# Patient Record
Sex: FEMALE | Race: WHITE | NOT HISPANIC OR LATINO
[De-identification: names, ages, dates, MRNs, and addresses within clinical notes are randomized per-mention and may not be internally consistent; named-entity substitution may affect disease eponyms.]

---

## 2019-09-16 ENCOUNTER — ASOB RESULT (OUTPATIENT)
Age: 29
End: 2019-09-16

## 2019-09-16 ENCOUNTER — APPOINTMENT (OUTPATIENT)
Dept: ANTEPARTUM | Facility: CLINIC | Age: 29
End: 2019-09-16
Payer: COMMERCIAL

## 2019-09-16 PROBLEM — Z00.00 ENCOUNTER FOR PREVENTIVE HEALTH EXAMINATION: Status: ACTIVE | Noted: 2019-09-16

## 2019-09-16 PROCEDURE — 76805 OB US >/= 14 WKS SNGL FETUS: CPT

## 2019-12-22 ENCOUNTER — INPATIENT (INPATIENT)
Facility: HOSPITAL | Age: 29
LOS: 1 days | Discharge: HOME | End: 2019-12-24
Attending: OBSTETRICS & GYNECOLOGY | Admitting: OBSTETRICS & GYNECOLOGY
Payer: COMMERCIAL

## 2019-12-22 VITALS
RESPIRATION RATE: 18 BRPM | SYSTOLIC BLOOD PRESSURE: 124 MMHG | HEART RATE: 71 BPM | OXYGEN SATURATION: 100 % | DIASTOLIC BLOOD PRESSURE: 65 MMHG

## 2019-12-22 DIAGNOSIS — Z34.80 ENCOUNTER FOR SUPERVISION OF OTHER NORMAL PREGNANCY, UNSPECIFIED TRIMESTER: ICD-10-CM

## 2019-12-22 DIAGNOSIS — O26.899 OTHER SPECIFIED PREGNANCY RELATED CONDITIONS, UNSPECIFIED TRIMESTER: ICD-10-CM

## 2019-12-22 DIAGNOSIS — Z3A.00 WEEKS OF GESTATION OF PREGNANCY NOT SPECIFIED: ICD-10-CM

## 2019-12-22 LAB
ALBUMIN SERPL ELPH-MCNC: 3.5 G/DL — SIGNIFICANT CHANGE UP (ref 3.3–5)
ALP SERPL-CCNC: 231 U/L — HIGH (ref 40–120)
ALT FLD-CCNC: 7 U/L — LOW (ref 10–45)
ANION GAP SERPL CALC-SCNC: 13 MMOL/L — SIGNIFICANT CHANGE UP (ref 5–17)
APTT BLD: 34.7 SEC — SIGNIFICANT CHANGE UP (ref 27.5–36.3)
AST SERPL-CCNC: 16 U/L — SIGNIFICANT CHANGE UP (ref 10–40)
BASOPHILS # BLD AUTO: 0.04 K/UL — SIGNIFICANT CHANGE UP (ref 0–0.2)
BASOPHILS NFR BLD AUTO: 0.4 % — SIGNIFICANT CHANGE UP (ref 0–2)
BILIRUB SERPL-MCNC: 0.3 MG/DL — SIGNIFICANT CHANGE UP (ref 0.2–1.2)
BLD GP AB SCN SERPL QL: NEGATIVE — SIGNIFICANT CHANGE UP
BUN SERPL-MCNC: 11 MG/DL — SIGNIFICANT CHANGE UP (ref 7–23)
CALCIUM SERPL-MCNC: 9.3 MG/DL — SIGNIFICANT CHANGE UP (ref 8.4–10.5)
CHLORIDE SERPL-SCNC: 103 MMOL/L — SIGNIFICANT CHANGE UP (ref 96–108)
CO2 SERPL-SCNC: 22 MMOL/L — SIGNIFICANT CHANGE UP (ref 22–31)
CREAT SERPL-MCNC: 0.62 MG/DL — SIGNIFICANT CHANGE UP (ref 0.5–1.3)
EOSINOPHIL # BLD AUTO: 0.08 K/UL — SIGNIFICANT CHANGE UP (ref 0–0.5)
EOSINOPHIL NFR BLD AUTO: 0.8 % — SIGNIFICANT CHANGE UP (ref 0–6)
FIBRINOGEN PPP-MCNC: 417 MG/DL — SIGNIFICANT CHANGE UP (ref 350–510)
GLUCOSE SERPL-MCNC: 91 MG/DL — SIGNIFICANT CHANGE UP (ref 70–99)
HCT VFR BLD CALC: 36.4 % — SIGNIFICANT CHANGE UP (ref 34.5–45)
HGB BLD-MCNC: 12.1 G/DL — SIGNIFICANT CHANGE UP (ref 11.5–15.5)
IMM GRANULOCYTES NFR BLD AUTO: 1.2 % — SIGNIFICANT CHANGE UP (ref 0–1.5)
INR BLD: 0.92 RATIO — SIGNIFICANT CHANGE UP (ref 0.88–1.16)
LDH SERPL L TO P-CCNC: 140 U/L — SIGNIFICANT CHANGE UP (ref 50–242)
LYMPHOCYTES # BLD AUTO: 1.75 K/UL — SIGNIFICANT CHANGE UP (ref 1–3.3)
LYMPHOCYTES # BLD AUTO: 17.3 % — SIGNIFICANT CHANGE UP (ref 13–44)
MCHC RBC-ENTMCNC: 31.3 PG — SIGNIFICANT CHANGE UP (ref 27–34)
MCHC RBC-ENTMCNC: 33.2 GM/DL — SIGNIFICANT CHANGE UP (ref 32–36)
MCV RBC AUTO: 94.1 FL — SIGNIFICANT CHANGE UP (ref 80–100)
MONOCYTES # BLD AUTO: 0.71 K/UL — SIGNIFICANT CHANGE UP (ref 0–0.9)
MONOCYTES NFR BLD AUTO: 7 % — SIGNIFICANT CHANGE UP (ref 2–14)
NEUTROPHILS # BLD AUTO: 7.41 K/UL — HIGH (ref 1.8–7.4)
NEUTROPHILS NFR BLD AUTO: 73.3 % — SIGNIFICANT CHANGE UP (ref 43–77)
NRBC # BLD: 0 /100 WBCS — SIGNIFICANT CHANGE UP (ref 0–0)
PLATELET # BLD AUTO: 182 K/UL — SIGNIFICANT CHANGE UP (ref 150–400)
POTASSIUM SERPL-MCNC: 3.6 MMOL/L — SIGNIFICANT CHANGE UP (ref 3.5–5.3)
POTASSIUM SERPL-SCNC: 3.6 MMOL/L — SIGNIFICANT CHANGE UP (ref 3.5–5.3)
PROT SERPL-MCNC: 6.5 G/DL — SIGNIFICANT CHANGE UP (ref 6–8.3)
PROTHROM AB SERPL-ACNC: 10.5 SEC — SIGNIFICANT CHANGE UP (ref 10–12.9)
RBC # BLD: 3.87 M/UL — SIGNIFICANT CHANGE UP (ref 3.8–5.2)
RBC # FLD: 13.2 % — SIGNIFICANT CHANGE UP (ref 10.3–14.5)
RH IG SCN BLD-IMP: POSITIVE — SIGNIFICANT CHANGE UP
RH IG SCN BLD-IMP: POSITIVE — SIGNIFICANT CHANGE UP
SODIUM SERPL-SCNC: 138 MMOL/L — SIGNIFICANT CHANGE UP (ref 135–145)
URATE SERPL-MCNC: 4.9 MG/DL — SIGNIFICANT CHANGE UP (ref 2.5–7)
WBC # BLD: 10.11 K/UL — SIGNIFICANT CHANGE UP (ref 3.8–10.5)
WBC # FLD AUTO: 10.11 K/UL — SIGNIFICANT CHANGE UP (ref 3.8–10.5)

## 2019-12-22 RX ORDER — OXYCODONE HYDROCHLORIDE 5 MG/1
5 TABLET ORAL
Refills: 0 | Status: DISCONTINUED | OUTPATIENT
Start: 2019-12-22 | End: 2019-12-24

## 2019-12-22 RX ORDER — OXYTOCIN 10 UNIT/ML
333.33 VIAL (ML) INJECTION
Qty: 20 | Refills: 0 | Status: DISCONTINUED | OUTPATIENT
Start: 2019-12-22 | End: 2019-12-24

## 2019-12-22 RX ORDER — SIMETHICONE 80 MG/1
80 TABLET, CHEWABLE ORAL EVERY 4 HOURS
Refills: 0 | Status: DISCONTINUED | OUTPATIENT
Start: 2019-12-22 | End: 2019-12-24

## 2019-12-22 RX ORDER — SODIUM CHLORIDE 9 MG/ML
1000 INJECTION, SOLUTION INTRAVENOUS
Refills: 0 | Status: DISCONTINUED | OUTPATIENT
Start: 2019-12-22 | End: 2019-12-22

## 2019-12-22 RX ORDER — BENZOCAINE 10 %
1 GEL (GRAM) MUCOUS MEMBRANE EVERY 6 HOURS
Refills: 0 | Status: DISCONTINUED | OUTPATIENT
Start: 2019-12-22 | End: 2019-12-24

## 2019-12-22 RX ORDER — SODIUM CHLORIDE 9 MG/ML
3 INJECTION INTRAMUSCULAR; INTRAVENOUS; SUBCUTANEOUS EVERY 8 HOURS
Refills: 0 | Status: DISCONTINUED | OUTPATIENT
Start: 2019-12-22 | End: 2019-12-24

## 2019-12-22 RX ORDER — PRAMOXINE HYDROCHLORIDE 150 MG/15G
1 AEROSOL, FOAM RECTAL EVERY 4 HOURS
Refills: 0 | Status: DISCONTINUED | OUTPATIENT
Start: 2019-12-22 | End: 2019-12-24

## 2019-12-22 RX ORDER — IBUPROFEN 200 MG
600 TABLET ORAL EVERY 6 HOURS
Refills: 0 | Status: DISCONTINUED | OUTPATIENT
Start: 2019-12-22 | End: 2019-12-24

## 2019-12-22 RX ORDER — AER TRAVELER 0.5 G/1
1 SOLUTION RECTAL; TOPICAL EVERY 4 HOURS
Refills: 0 | Status: DISCONTINUED | OUTPATIENT
Start: 2019-12-22 | End: 2019-12-24

## 2019-12-22 RX ORDER — TETANUS TOXOID, REDUCED DIPHTHERIA TOXOID AND ACELLULAR PERTUSSIS VACCINE, ADSORBED 5; 2.5; 8; 8; 2.5 [IU]/.5ML; [IU]/.5ML; UG/.5ML; UG/.5ML; UG/.5ML
0.5 SUSPENSION INTRAMUSCULAR ONCE
Refills: 0 | Status: DISCONTINUED | OUTPATIENT
Start: 2019-12-22 | End: 2019-12-24

## 2019-12-22 RX ORDER — GLYCERIN ADULT
1 SUPPOSITORY, RECTAL RECTAL AT BEDTIME
Refills: 0 | Status: DISCONTINUED | OUTPATIENT
Start: 2019-12-22 | End: 2019-12-24

## 2019-12-22 RX ORDER — DIBUCAINE 1 %
1 OINTMENT (GRAM) RECTAL EVERY 6 HOURS
Refills: 0 | Status: DISCONTINUED | OUTPATIENT
Start: 2019-12-22 | End: 2019-12-24

## 2019-12-22 RX ORDER — OXYTOCIN 10 UNIT/ML
4 VIAL (ML) INJECTION
Qty: 30 | Refills: 0 | Status: DISCONTINUED | OUTPATIENT
Start: 2019-12-22 | End: 2019-12-22

## 2019-12-22 RX ORDER — ACETAMINOPHEN 500 MG
975 TABLET ORAL
Refills: 0 | Status: DISCONTINUED | OUTPATIENT
Start: 2019-12-22 | End: 2019-12-24

## 2019-12-22 RX ORDER — OXYCODONE HYDROCHLORIDE 5 MG/1
5 TABLET ORAL ONCE
Refills: 0 | Status: DISCONTINUED | OUTPATIENT
Start: 2019-12-22 | End: 2019-12-24

## 2019-12-22 RX ORDER — DIPHENHYDRAMINE HCL 50 MG
25 CAPSULE ORAL EVERY 6 HOURS
Refills: 0 | Status: DISCONTINUED | OUTPATIENT
Start: 2019-12-22 | End: 2019-12-24

## 2019-12-22 RX ORDER — KETOROLAC TROMETHAMINE 30 MG/ML
30 SYRINGE (ML) INJECTION ONCE
Refills: 0 | Status: DISCONTINUED | OUTPATIENT
Start: 2019-12-22 | End: 2019-12-22

## 2019-12-22 RX ORDER — HYDROCORTISONE 1 %
1 OINTMENT (GRAM) TOPICAL EVERY 6 HOURS
Refills: 0 | Status: DISCONTINUED | OUTPATIENT
Start: 2019-12-22 | End: 2019-12-24

## 2019-12-22 RX ORDER — LANOLIN
1 OINTMENT (GRAM) TOPICAL EVERY 6 HOURS
Refills: 0 | Status: DISCONTINUED | OUTPATIENT
Start: 2019-12-22 | End: 2019-12-24

## 2019-12-22 RX ORDER — MAGNESIUM HYDROXIDE 400 MG/1
30 TABLET, CHEWABLE ORAL
Refills: 0 | Status: DISCONTINUED | OUTPATIENT
Start: 2019-12-22 | End: 2019-12-24

## 2019-12-22 RX ORDER — OXYTOCIN 10 UNIT/ML
333.33 VIAL (ML) INJECTION
Qty: 20 | Refills: 0 | Status: COMPLETED | OUTPATIENT
Start: 2019-12-22 | End: 2019-12-22

## 2019-12-22 RX ORDER — IBUPROFEN 200 MG
600 TABLET ORAL EVERY 6 HOURS
Refills: 0 | Status: COMPLETED | OUTPATIENT
Start: 2019-12-22 | End: 2020-11-19

## 2019-12-22 RX ORDER — CITRIC ACID/SODIUM CITRATE 300-500 MG
15 SOLUTION, ORAL ORAL EVERY 6 HOURS
Refills: 0 | Status: DISCONTINUED | OUTPATIENT
Start: 2019-12-22 | End: 2019-12-22

## 2019-12-22 RX ADMIN — Medication 975 MILLIGRAM(S): at 14:42

## 2019-12-22 RX ADMIN — Medication 600 MILLIGRAM(S): at 17:35

## 2019-12-22 RX ADMIN — Medication 1000 MILLIUNIT(S)/MIN: at 13:37

## 2019-12-22 RX ADMIN — Medication 30 MILLIGRAM(S): at 12:01

## 2019-12-22 RX ADMIN — Medication 600 MILLIGRAM(S): at 17:57

## 2019-12-22 RX ADMIN — Medication 975 MILLIGRAM(S): at 15:15

## 2019-12-22 RX ADMIN — Medication 975 MILLIGRAM(S): at 22:30

## 2019-12-22 RX ADMIN — Medication 1000 MILLIUNIT(S)/MIN: at 13:38

## 2019-12-22 RX ADMIN — SODIUM CHLORIDE 125 MILLILITER(S): 9 INJECTION, SOLUTION INTRAVENOUS at 08:03

## 2019-12-22 RX ADMIN — Medication 975 MILLIGRAM(S): at 21:38

## 2019-12-22 RX ADMIN — Medication 600 MILLIGRAM(S): at 23:45

## 2019-12-22 RX ADMIN — SODIUM CHLORIDE 3 MILLILITER(S): 9 INJECTION INTRAMUSCULAR; INTRAVENOUS; SUBCUTANEOUS at 23:42

## 2019-12-23 LAB
HCT VFR BLD CALC: 30.8 % — LOW (ref 34.5–45)
HGB BLD-MCNC: 10.4 G/DL — LOW (ref 11.5–15.5)
T PALLIDUM AB TITR SER: NEGATIVE — SIGNIFICANT CHANGE UP

## 2019-12-23 RX ADMIN — Medication 975 MILLIGRAM(S): at 09:57

## 2019-12-23 RX ADMIN — Medication 600 MILLIGRAM(S): at 00:30

## 2019-12-23 RX ADMIN — Medication 600 MILLIGRAM(S): at 15:04

## 2019-12-23 RX ADMIN — Medication 600 MILLIGRAM(S): at 06:17

## 2019-12-23 RX ADMIN — Medication 600 MILLIGRAM(S): at 15:34

## 2019-12-23 RX ADMIN — Medication 975 MILLIGRAM(S): at 21:41

## 2019-12-23 RX ADMIN — Medication 975 MILLIGRAM(S): at 03:25

## 2019-12-23 RX ADMIN — Medication 1 TABLET(S): at 15:03

## 2019-12-23 RX ADMIN — Medication 975 MILLIGRAM(S): at 21:11

## 2019-12-23 RX ADMIN — Medication 975 MILLIGRAM(S): at 10:27

## 2019-12-23 RX ADMIN — Medication 975 MILLIGRAM(S): at 04:00

## 2019-12-23 RX ADMIN — SODIUM CHLORIDE 3 MILLILITER(S): 9 INJECTION INTRAMUSCULAR; INTRAVENOUS; SUBCUTANEOUS at 06:18

## 2019-12-23 NOTE — PROGRESS NOTE ADULT - SUBJECTIVE AND OBJECTIVE BOX
OB Progress Note:  PPD#1    S: 30yo  PPD#1 s/p . Patient feels well. Pain is well controlled, tolerating regular diet, passing flatus, voiding spontaneously, ambulating without difficulty. Denies heavy vaginal bleeding, CP/SOB, N/V, lightheadedness/dizziness.     O:  Vitals:  Vital Signs Last 24 Hrs  T(C): 36.8 (23 Dec 2019 05:06), Max: 37.2 (22 Dec 2019 21:25)  T(F): 98.2 (23 Dec 2019 05:06), Max: 99 (22 Dec 2019 21:25)  HR: 63 (23 Dec 2019 05:06) (50 - 83)  BP: 99/54 (23 Dec 2019 05:06) (99/54 - 135/69)  BP(mean): --  RR: 18 (23 Dec 2019 05:06) (17 - 18)  SpO2: 97% (23 Dec 2019 05:06) (97% - 100%)    MEDICATIONS  (STANDING):  acetaminophen   Tablet .. 975 milliGRAM(s) Oral <User Schedule>  diphtheria/tetanus/pertussis (acellular) Vaccine (ADAcel) 0.5 milliLiter(s) IntraMuscular once  ibuprofen  Tablet. 600 milliGRAM(s) Oral every 6 hours  oxytocin Infusion 333.333 milliUNIT(s)/Min (1000 mL/Hr) IV Continuous <Continuous>  prenatal multivitamin 1 Tablet(s) Oral daily  sodium chloride 0.9% lock flush 3 milliLiter(s) IV Push every 8 hours      Labs:  Blood type: O Positive  Rubella IgG: RPR: Negative                          12.1   10.11 >-----------< 182    (  @ 07:49 )             36.4    19 @ 08:05      138  |  103  |  11  ----------------------------<  91  3.6   |  22  |  0.62        Ca    9.3      22 Dec 2019 08:05    TPro  6.5  /  Alb  3.5  /  TBili  0.3  /  DBili  x   /  AST  16  /  ALT  7<L>  /  AlkPhos  231<H>  19 @ 08:05          Physical Exam:  General: NAD  Abdomen: soft, non-tender, non-distended, fundus firm  Vaginal: No heavy vaginal bleeding  Extremities: No erythema/edema

## 2019-12-23 NOTE — PROGRESS NOTE ADULT - PROBLEM SELECTOR PLAN 1
- Pain well controlled, continue current pain regimen  - Increase ambulation, SCDs when not ambulating  - Continue regular diet      Merna Dooley, PGY-1

## 2019-12-24 ENCOUNTER — TRANSCRIPTION ENCOUNTER (OUTPATIENT)
Age: 29
End: 2019-12-24

## 2019-12-24 VITALS
RESPIRATION RATE: 18 BRPM | HEART RATE: 67 BPM | OXYGEN SATURATION: 98 % | DIASTOLIC BLOOD PRESSURE: 83 MMHG | TEMPERATURE: 98 F | SYSTOLIC BLOOD PRESSURE: 134 MMHG

## 2019-12-24 PROCEDURE — 85014 HEMATOCRIT: CPT

## 2019-12-24 PROCEDURE — 80053 COMPREHEN METABOLIC PANEL: CPT

## 2019-12-24 PROCEDURE — G0463: CPT

## 2019-12-24 PROCEDURE — 83615 LACTATE (LD) (LDH) ENZYME: CPT

## 2019-12-24 PROCEDURE — 85610 PROTHROMBIN TIME: CPT

## 2019-12-24 PROCEDURE — 85384 FIBRINOGEN ACTIVITY: CPT

## 2019-12-24 PROCEDURE — 86850 RBC ANTIBODY SCREEN: CPT

## 2019-12-24 PROCEDURE — 84550 ASSAY OF BLOOD/URIC ACID: CPT

## 2019-12-24 PROCEDURE — 85730 THROMBOPLASTIN TIME PARTIAL: CPT

## 2019-12-24 PROCEDURE — 59025 FETAL NON-STRESS TEST: CPT

## 2019-12-24 PROCEDURE — 86900 BLOOD TYPING SEROLOGIC ABO: CPT

## 2019-12-24 PROCEDURE — 59050 FETAL MONITOR W/REPORT: CPT

## 2019-12-24 PROCEDURE — 86780 TREPONEMA PALLIDUM: CPT

## 2019-12-24 PROCEDURE — 86901 BLOOD TYPING SEROLOGIC RH(D): CPT

## 2019-12-24 PROCEDURE — 85018 HEMOGLOBIN: CPT

## 2019-12-24 PROCEDURE — 85027 COMPLETE CBC AUTOMATED: CPT

## 2019-12-24 RX ADMIN — Medication 975 MILLIGRAM(S): at 09:06

## 2019-12-24 RX ADMIN — Medication 600 MILLIGRAM(S): at 00:42

## 2019-12-24 RX ADMIN — Medication 975 MILLIGRAM(S): at 03:30

## 2019-12-24 RX ADMIN — Medication 600 MILLIGRAM(S): at 00:12

## 2019-12-24 RX ADMIN — Medication 600 MILLIGRAM(S): at 06:09

## 2019-12-24 RX ADMIN — Medication 600 MILLIGRAM(S): at 06:39

## 2019-12-24 RX ADMIN — Medication 975 MILLIGRAM(S): at 03:00

## 2019-12-24 NOTE — DISCHARGE NOTE OB - CARE PLAN
(3) slightly limited Principal Discharge DX:	Pregnancy  Goal:	home  Assessment and plan of treatment:	per pmd

## 2019-12-24 NOTE — DISCHARGE NOTE OB - PATIENT PORTAL LINK FT
You can access the FollowMyHealth Patient Portal offered by [facility] by registering at the following website: [email or web address]. By joining [employer]’s FollowMyHealth portal, you will also be able to view your health information using other applications (apps) compatible with our system.

## 2019-12-24 NOTE — DISCHARGE NOTE OB - CARE PROVIDER_API CALL
Samy Hunter)  Obstetrics and Gynecology  [address] [place] [id number]  Phone: [phone]  Fax: [phone]  Follow Up Time:

## 2019-12-24 NOTE — PROGRESS NOTE ADULT - SUBJECTIVE AND OBJECTIVE BOX
S: Patient doing well. No complaints. Minimal lochia. Pain controlled.    O: Vital Signs Last 24 Hrs  T(C): 36.7 (24 Dec 2019 05:09), Max: 36.7 (24 Dec 2019 05:09)  T(F): 98.1 (24 Dec 2019 05:09), Max: 98.1 (24 Dec 2019 05:09)  HR: 59 (24 Dec 2019 05:09) (58 - 60)  BP: 120/75 (24 Dec 2019 05:09) (110/77 - 120/75)  BP(mean): --  RR: 18 (24 Dec 2019 05:09) (18 - 18)  SpO2: 96% (23 Dec 2019 17:41) (96% - 99%)    Gen: NAD  Abd: soft, Nontender, Nondistended, fundus firm  Ext: no tenderness, mild edema    Labs:                        10.4   x     )-----------( x        ( 23 Dec 2019 07:43 )             30.8       A: 29y PPD#2 s/p  doing well.    Plan:  Routine postpartum care  Encouraged out of bed  Regular diet    Discharge  MARLON Toney MD

## 2021-04-22 ENCOUNTER — APPOINTMENT (OUTPATIENT)
Dept: ORTHOPEDIC SURGERY | Facility: CLINIC | Age: 31
End: 2021-04-22
Payer: COMMERCIAL

## 2021-04-22 VITALS
HEART RATE: 73 BPM | WEIGHT: 125 LBS | DIASTOLIC BLOOD PRESSURE: 80 MMHG | HEIGHT: 67 IN | BODY MASS INDEX: 19.62 KG/M2 | SYSTOLIC BLOOD PRESSURE: 130 MMHG

## 2021-04-22 DIAGNOSIS — Z78.9 OTHER SPECIFIED HEALTH STATUS: ICD-10-CM

## 2021-04-22 DIAGNOSIS — M75.22 BICIPITAL TENDINITIS, LEFT SHOULDER: ICD-10-CM

## 2021-04-22 PROCEDURE — 99203 OFFICE O/P NEW LOW 30 MIN: CPT

## 2021-04-22 PROCEDURE — 99072 ADDL SUPL MATRL&STAF TM PHE: CPT

## 2021-04-22 NOTE — HISTORY OF PRESENT ILLNESS
[de-identified] : RHD Patient is here for left shoulder pain that began about a month ago. She does not recall any inciting injury but she does do MMA training. She thought it was muscular and that it would go away on its own but it has persisted. The pain is located mainly anteriorly. It is worse with movement. There is a constant dull ache. She has used rest to treat it.  Denies N/T/R/Prior injury. She has two children at home. She works a desk job. \par \par The patient's past medical history, past surgical history, medications and allergies were reviewed by me today and documented accordingly. In addition, the patient's family and social history, which were noncontributory to this visit, were reviewed also. Intake form was reviewed. The patient has no family history of arthritis.

## 2021-04-22 NOTE — DISCUSSION/SUMMARY
[de-identified] : Discussed findings of today's exam and possible causes of patient's pain.  Educated patient on their most probable diagnosis of left bicipital tenosynovitis.  Reviewed possible courses of treatment, and we collaboratively decided best course of treatment at this time will include conservative management.  Patient started on a course of oral NSAIDs, prescription given for Mobic (We discussed all possible side effects of this medication).  Patient advised regarding modification of activity with her MMA activity, and potentially with caring for her 1 and 3-year-old children.  If patient has persisting pain would recommend a course of physical therapy at that time.  Follow up as needed.  Patient appreciates and agrees with current plan.\par \par This note was generated using dragon medical dictation software.  A reasonable effort has been made for proofreading its contents, but typos may still remain.  If there are any questions or points of clarification needed please notify my office.\par

## 2021-05-14 ENCOUNTER — RX RENEWAL (OUTPATIENT)
Age: 31
End: 2021-05-14

## 2021-05-14 RX ORDER — MELOXICAM 7.5 MG/1
7.5 TABLET ORAL
Qty: 30 | Refills: 0 | Status: ACTIVE | COMMUNITY
Start: 2021-04-22 | End: 1900-01-01

## 2021-08-18 ENCOUNTER — NON-APPOINTMENT (OUTPATIENT)
Age: 31
End: 2021-08-18

## 2021-08-19 ENCOUNTER — APPOINTMENT (OUTPATIENT)
Dept: ORTHOPEDIC SURGERY | Facility: CLINIC | Age: 31
End: 2021-08-19
Payer: COMMERCIAL

## 2021-08-19 DIAGNOSIS — G57.02 LESION OF SCIATIC NERVE, LEFT LOWER LIMB: ICD-10-CM

## 2021-08-19 PROCEDURE — 99213 OFFICE O/P EST LOW 20 MIN: CPT

## 2021-08-19 NOTE — DISCUSSION/SUMMARY
[de-identified] : Discussed findings of today's exam and possible causes of patient's pain.  Educated patient on their most probable diagnosis of left piriformis syndrome/sciatica.  Reviewed possible courses of treatment, and we collaboratively decided best course of treatment at this time will include conservative management.  Patient has Mobic at home, she may take this medication as needed for pain.  Patient will be started on a course of physical therapy to restore normal range of motion and strength as tolerated.  We discussed appropriate activity modification regarding her MMA activity.  Follow up as needed.  Patient appreciates and agrees with current plan.\par \par This note was generated using dragon medical dictation software.  A reasonable effort has been made for proofreading its contents, but typos may still remain.  If there are any questions or points of clarification needed please notify my office.\par

## 2021-08-19 NOTE — PHYSICAL EXAM
[de-identified] : Constitutional: Well-nourished, well-developed, No acute distress\par Respiratory:  Good respiratory effort, no SOB\par Lymphatic: No regional lymphadenopathy, no lymphedema\par Psychiatric: Pleasant and normal affect, alert and oriented x3\par Musculoskeletal: normal except where as noted in regional exam\par \par Lumbar Spine Exam\par \par APPEARANCE: no marked deformities or malalignment, normal curvature of the lumbosacral spine. no marked deformities, good posture.\par POSITIVE TENDERNESS: none\par NONTENDER: no bony midline tenderness, no marked tenderness in paravertebral muscles or erector spinae group, no marked spasm.  no marked tenderness in lumbar, lumbosacral, or iliac areas.\par ROM: full & painless in all planes\par RESISTIVE TESTING: painless 5/5 resisted flex/ext, sidebending b/l, and rotation\par SPECIAL TESTS: neg SLR b/l, neg BONI b/l, neg Trendelenburg b/l \par PULSES: 2+ DP/PT pulses\par NEURO:  L1 - S2 intact to sensation and motor, DTRs 2+/4 patella and achilles\par \par Left hip:\par APPEARANCE: no marked deformities, no swelling or malalignment\par POSITIVE TENDERNESS: + Piriformis at midsubstance and at its insertion on the greater trochanter\par NONTENDER: greater trochanter, trochanteric bursa, TFL, gluteal region, ischium/proximal hamstring region, hip flexor region, sartorius and pubic symphysis. \par ROM: full & painless. \par RESISTIVE TESTING: Mild pain with resisted hip ER, particularly at 90 of hip flexion, painless resisted IR/SLR/abduction/adduction. \par SPECIAL TESTS: BONI reproduces mild pain and tightness in the buttock region, neg FADIR, painless loaded flexion & scouring\par

## 2021-08-19 NOTE — HISTORY OF PRESENT ILLNESS
[de-identified] : Patient is here for LBP that began about a week ago. There was no inciting injury. She thought she pulled a muscle when exercising. She went on vacation. when she returned to exercise she began having pain in her left buttock that radiates down towards the knee. It is better with sitting. She has used Tylenol and Meloxicam to treat it. Denies N/T/Prior injury/bowel or bladder dysfunction/saddle anesthesia.

## 2021-09-23 ENCOUNTER — APPOINTMENT (OUTPATIENT)
Dept: ORTHOPEDIC SURGERY | Facility: CLINIC | Age: 31
End: 2021-09-23
Payer: COMMERCIAL

## 2021-09-23 VITALS — WEIGHT: 120 LBS | BODY MASS INDEX: 18.83 KG/M2 | HEIGHT: 67 IN

## 2021-09-23 PROCEDURE — 72100 X-RAY EXAM L-S SPINE 2/3 VWS: CPT

## 2021-09-23 PROCEDURE — 99213 OFFICE O/P EST LOW 20 MIN: CPT

## 2021-09-24 NOTE — HISTORY OF PRESENT ILLNESS
[de-identified] : Patient is here for LBP follow up. She states that she attended PT and feels like at one point she pushed herself to fall with an exercise. She feels like her body has shifted. She states that the pain is worse. There was no trauma. She is taking Meloxicam that we prescribed for her during the day and Flexeril at night which was prescribed by her .

## 2021-09-24 NOTE — PHYSICAL EXAM
[de-identified] : Constitutional: Well-nourished, well-developed, No acute distress\par Respiratory:  Good respiratory effort, no SOB\par Lymphatic: No regional lymphadenopathy, no lymphedema\par Psychiatric: Pleasant and normal affect, alert and oriented x3\par Musculoskeletal: normal except where as noted in regional exam\par \par Lumbar Spine Exam\par \par APPEARANCE: no marked deformities or malalignment, normal curvature of the lumbosacral spine. no marked deformities, good posture.\par POSITIVE TENDERNESS: none\par NONTENDER: no bony midline tenderness, no marked tenderness in paravertebral muscles or erector spinae group, no marked spasm.  no marked tenderness in lumbar, lumbosacral, or iliac areas.\par ROM: full & painless in all planes\par RESISTIVE TESTING: painless 5/5 resisted flex/ext, sidebending b/l, and rotation\par SPECIAL TESTS: neg SLR b/l, neg BONI b/l, neg Trendelenburg b/l \par PULSES: 2+ DP/PT pulses\par NEURO:  L1 - S2 intact to sensation and motor, DTRs 2+/4 patella and achilles\par \par Left hip:\par APPEARANCE: no marked deformities, no swelling or malalignment\par POSITIVE TENDERNESS: + Piriformis at midsubstance and at its insertion on the greater trochanter\par NONTENDER: greater trochanter, trochanteric bursa, TFL, gluteal region, ischium/proximal hamstring region, hip flexor region, sartorius and pubic symphysis. \par ROM: full & painless. \par RESISTIVE TESTING: Mild pain with resisted hip ER, particularly at 90 of hip flexion, painless resisted IR/SLR/abduction/adduction. \par SPECIAL TESTS: BONI reproduces mild pain and tightness in the buttock region, neg FADIR, painless loaded flexion & scouring\par  [de-identified] : \par The following radiographs were ordered and read by me during this patient's visit. I reviewed each radiograph in detail with the patient and discussed the findings as highlighted below. \par \par 2 views of the lumbar spine were obtained today that show no fracture, or dislocation. There are no degenerative changes seen. There is no significant malalignment but there is slight dextroscoliosis. No obvious osseous abnormality. Otherwise unremarkable.

## 2021-09-24 NOTE — DISCUSSION/SUMMARY
[de-identified] : Patient was seen today for reevaluation of low back, buttock and left lower extremity pain.  Based on history and today's clinical exam it seems more likely that patient has left lumbar radiculopathy than she does have left piriformis syndrome/sciatica.  At this time recommend MRI of the lumbar spine to evaluate for left-sided disc herniation as etiology of the patient's pain.  If present, she would benefit from physiatry consultation for PATSY.  Patient may continue taking Mobic during the day, Flexeril at night, and continue her course of physical therapy to see if he can help alleviate this persistent pain.  Patient will follow-up once MRI results are available.  Patient and spouse appreciate and agree with current plan.\par \par I work as part of an academic orthopedic group and routinely have a physician in training (resident / fellow) working with me.  Any part of the history and physical exam performed by the physician in training was either directly reviewed and/or replicated by myself.  Any procedure performed by the physician in training was performed under my direct supervision and with the consent of the patient.\par \par This note was generated using dragon medical dictation software.  A reasonable effort has been made for proofreading its contents, but typos may still remain.  If there are any questions or points of clarification needed please notify my office.\par

## 2021-09-28 ENCOUNTER — RESULT REVIEW (OUTPATIENT)
Age: 31
End: 2021-09-28

## 2021-09-28 ENCOUNTER — APPOINTMENT (OUTPATIENT)
Dept: MRI IMAGING | Facility: CLINIC | Age: 31
End: 2021-09-28
Payer: COMMERCIAL

## 2021-09-28 PROCEDURE — 72148 MRI LUMBAR SPINE W/O DYE: CPT

## 2021-09-29 ENCOUNTER — NON-APPOINTMENT (OUTPATIENT)
Age: 31
End: 2021-09-29

## 2021-10-06 ENCOUNTER — APPOINTMENT (OUTPATIENT)
Dept: PHYSICAL MEDICINE AND REHAB | Facility: CLINIC | Age: 31
End: 2021-10-06
Payer: COMMERCIAL

## 2021-10-06 PROCEDURE — 99204 OFFICE O/P NEW MOD 45 MIN: CPT

## 2021-10-06 RX ORDER — PREDNISONE 10 MG/1
10 TABLET ORAL
Qty: 30 | Refills: 0 | Status: ACTIVE | COMMUNITY
Start: 2021-10-06 | End: 1900-01-01

## 2021-10-11 NOTE — ASSESSMENT
[FreeTextEntry1] : 29 yo F who presents with low back pain with radiation into the left lower extremity consistent with lumbar radiculopathy secondary to lumbosacral stenosis and lumbar disc herniation.\par \par I had an extended discussion with Ms. Cai on this visit.  Various topics were covered including diagnosis, treatment options, and prognosis.  Relevant anatomy and treatment rationale reviewed.  Anticipatory guidance provided including instructions on how to manage future flares were discussed.  Lastly, red flag signs and symptoms were reviewed and patient instructed to seek immediate medical attention should these arise.\par \par Several treatment options discussed with Ms. Cai on this visit including lumbar PATSY.  Ms. Cai has elected for further treatments with PO medication and PT/HEP.\par \par -Orthopedic notes reviewed\par -MRI lumbar spine w/o contrast reviewed\par -Prednisone 40 mg PO taper ordered.  Risks and complications including gastritis, hyperglycemia, and GI bleed were reviewed with patient on this visit.  I recommend that patient take medication with food and to immediately stop medication should he develop abdominal pain, nausea, vomiting, diarrhea, frequent urination, lightheadedness, or dizziness.\par -RTC 4 weeks, If pain persists or worsen despite compliance with above, then will consider scheduling lumbar PATSY at next visit if patient is amenable.\par \par Wesley Yang MD\par Spine and Sports Medicine\par \par [employer]\par At [facility]\par \par

## 2021-10-11 NOTE — HISTORY OF PRESENT ILLNESS
[FreeTextEntry1] : 29 yo F with no PMH who presents with low back pain.\par \par Onset: 6 weeks.  Started after abdominal crunches.  No inciting events, trauma, or falls.\par Location: lower lumbar spine\par Characteristics: sharp \par Aggravating factors: prolonged standing, walking\par Alleviating factors: rest\par Radiation: left lower extremity down to the left calf and foot.\par Treatments: tylenol, aleve, rest, physical therapy, HEP with minimal relief\par Severity: 7-9/10\par \par Diagnostic studies:\par MRI lumbar spine w/o contrast showing central-left sided disc herniation at L4-L5 with left foraminal stenosis and lateral recess stenosis.\par No EMG/NCS\par \par Patient denies new weakness, numbness or paresthesia.  Denies bowel/bladder dysfunction, saddle anesthesia, fevers, chills, weight loss, night pain, or night sweats.\par \par

## 2021-10-11 NOTE — PHYSICAL EXAM
[FreeTextEntry1] : Gen: NAD\par HEENT: neck supple\par CV: no cyanosis\par Pulm: breathing well on room air\par Abd: soft\par Low back: range of motion limited by pain, tenderness to palpation lower lumbar paraspinals and left sciatic notch, +straight leg raise LLE, neg FABERE, neg FAIR\par Msk: \par 5/5 hip flexion B/L, 5/5 knee extension B/L, 5/5 knee flexion B/L, 5/5 dorsiflexion B/L, 5/5 EHL B/L, 5/5 plantar flexion B/L\par 5/5 shoulder abduction B/L, 5/5 elbow flexion B/L, 5/5 elbow extension B/L, 5/5 wrist extension B/L, 5/5 hand  B/L\par Neuro: sensation intact to light touch in bilateral upper and lower extremities, reflexes 2+ brachioradialis, biceps, triceps bilaterally, reflexes 2+ patella, medial hamstring, achilles bilaterally, negative babinski, negative holt\par

## 2021-10-20 ENCOUNTER — APPOINTMENT (OUTPATIENT)
Dept: PHYSICAL MEDICINE AND REHAB | Facility: CLINIC | Age: 31
End: 2021-10-20
Payer: COMMERCIAL

## 2021-10-20 DIAGNOSIS — Z01.818 ENCOUNTER FOR OTHER PREPROCEDURAL EXAMINATION: ICD-10-CM

## 2021-10-20 PROCEDURE — 99442: CPT

## 2021-10-29 ENCOUNTER — APPOINTMENT (OUTPATIENT)
Dept: DISASTER EMERGENCY | Facility: CLINIC | Age: 31
End: 2021-10-29

## 2021-11-02 ENCOUNTER — OUTPATIENT (OUTPATIENT)
Dept: OUTPATIENT SERVICES | Facility: HOSPITAL | Age: 31
LOS: 1 days | End: 2021-11-02
Payer: COMMERCIAL

## 2021-11-02 ENCOUNTER — APPOINTMENT (OUTPATIENT)
Dept: PHYSICAL MEDICINE AND REHAB | Facility: CLINIC | Age: 31
End: 2021-11-02

## 2021-11-02 DIAGNOSIS — M54.16 RADICULOPATHY, LUMBAR REGION: ICD-10-CM

## 2021-11-02 LAB — SARS-COV-2 N GENE NPH QL NAA+PROBE: NOT DETECTED

## 2021-11-02 PROCEDURE — 64484 NJX AA&/STRD TFRM EPI L/S EA: CPT | Mod: LT

## 2021-11-02 PROCEDURE — 64483 NJX AA&/STRD TFRM EPI L/S 1: CPT

## 2021-11-02 PROCEDURE — 64483 NJX AA&/STRD TFRM EPI L/S 1: CPT | Mod: LT

## 2021-11-02 PROCEDURE — 64484 NJX AA&/STRD TFRM EPI L/S EA: CPT

## 2021-11-16 ENCOUNTER — APPOINTMENT (OUTPATIENT)
Dept: PHYSICAL MEDICINE AND REHAB | Facility: CLINIC | Age: 31
End: 2021-11-16
Payer: COMMERCIAL

## 2021-11-16 DIAGNOSIS — M48.07 SPINAL STENOSIS, LUMBOSACRAL REGION: ICD-10-CM

## 2021-11-16 DIAGNOSIS — M54.50 LOW BACK PAIN, UNSPECIFIED: ICD-10-CM

## 2021-11-16 PROCEDURE — 99214 OFFICE O/P EST MOD 30 MIN: CPT

## 2021-11-16 NOTE — HISTORY OF PRESENT ILLNESS
[FreeTextEntry1] : 11/16/21\par 29 yo F who presents for follow up with low back pain.  Patient s/p left L5-S1, S1 TFESI with significant improvement in her pain.  Pain is now graded as mild to moderate in severity.  However, pain continues to significantly limit her function and capacity to exercise and satisfy her IADL's.  Patient denies new weakness, numbness or paresthesia.  Denies bowel/bladder dysfunction, saddle anesthesia, fevers, chills, weight loss, night pain, or night sweats.\par \par 10/6/21\par 29 yo F with no PMH who presents with low back pain.\par \par Onset: 6 weeks.  Started after abdominal crunches.  No inciting events, trauma, or falls.\par Location: lower lumbar spine\par Characteristics: sharp \par Aggravating factors: prolonged standing, walking\par Alleviating factors: rest\par Radiation: left lower extremity down to the left calf and foot.\par Treatments: tylenol, aleve, rest, physical therapy, HEP with minimal relief\par Severity: 7-9/10\par \par Diagnostic studies:\par MRI lumbar spine w/o contrast showing central-left sided disc herniation at L4-L5 with left foraminal stenosis and lateral recess stenosis.\par No EMG/NCS\par \par Patient denies new weakness, numbness or paresthesia.  Denies bowel/bladder dysfunction, saddle anesthesia, fevers, chills, weight loss, night pain, or night sweats.\par \par

## 2021-11-16 NOTE — ASSESSMENT
[FreeTextEntry1] : 31 yo F who presents with low back pain with radiation into the left lower extremity consistent with lumbar radiculopathy secondary to lumbosacral stenosis and lumbar disc herniation.\par \par Several treatment options discussed with Ms. Cai on this visit including repeat lumbar PATSY.  Ms. Cai has elected for repeat lumbar PATSY and to restart PT/HEP.\par \par -Orthopedic notes reviewed\par -MRI lumbar spine w/o contrast reviewed\par -I recommend that patient undergo left L5-S1, S1 TFESI.  Risks and benefits discussed with patient.  Possible complications including, but not limited to, epidural abscess, hematoma formation, hyperglycemia, DKA, permanent neurologic dysfunction, paralysis, and death, were reviewed.  Will submit for insurance approval.  Once insurance approval has been obtained, patient will be contacted to schedule injection at out-patient ambulatory center.  Covid-19 referral provided to patient on this visit and patient has been instructed to undergo testing per unit protocol.  Covid-19 vaccination policy of our ASC reviewed and patient informed that no spinal injection will be allowed should patient have had the vaccine in the last 14 days or are planning to get the vaccine in the next 14 days.\par -Re-start PT/HEP, new referral provided on this visit.\par \par Wesley Yang MD\par Spine and Sports Medicine\par \par [employer]\par At [facility]\par \par

## 2021-11-16 NOTE — PHYSICAL EXAM
[FreeTextEntry1] : Gen: NAD\par HEENT: neck supple\par CV: no cyanosis\par Pulm: breathing well on room air\par Abd: soft\par Low back: range of motion limited by pain, tenderness to palpation lower lumbar paraspinals and left sciatic notch, +straight leg raise LLE, neg meyer's, neg FABERE, neg FAIR\par Msk: \par 5/5 hip flexion B/L, 5/5 knee extension B/L, 5/5 knee flexion B/L, 5/5 dorsiflexion B/L, 5/5 EHL B/L, 5/5 plantar flexion B/L\par 5/5 shoulder abduction B/L, 5/5 elbow flexion B/L, 5/5 elbow extension B/L, 5/5 wrist extension B/L, 5/5 hand  B/L\par Neuro: sensation intact to light touch in bilateral upper and lower extremities, reflexes 2+ brachioradialis, biceps, triceps bilaterally, reflexes 2+ patella, medial hamstring, achilles bilaterally, negative babinski, negative holt\par

## 2022-01-11 ENCOUNTER — OUTPATIENT (OUTPATIENT)
Dept: OUTPATIENT SERVICES | Facility: HOSPITAL | Age: 32
LOS: 1 days | End: 2022-01-11
Payer: COMMERCIAL

## 2022-01-11 ENCOUNTER — APPOINTMENT (OUTPATIENT)
Dept: PHYSICAL MEDICINE AND REHAB | Facility: AMBULATORY MEDICAL SERVICES | Age: 32
End: 2022-01-11

## 2022-01-11 DIAGNOSIS — M54.16 RADICULOPATHY, LUMBAR REGION: ICD-10-CM

## 2022-01-11 PROCEDURE — 64484 NJX AA&/STRD TFRM EPI L/S EA: CPT | Mod: LT

## 2022-01-11 PROCEDURE — 64483 NJX AA&/STRD TFRM EPI L/S 1: CPT

## 2022-01-11 PROCEDURE — 64484 NJX AA&/STRD TFRM EPI L/S EA: CPT

## 2022-01-11 PROCEDURE — 64483 NJX AA&/STRD TFRM EPI L/S 1: CPT | Mod: LT

## 2022-01-13 DIAGNOSIS — M54.17 RADICULOPATHY, LUMBOSACRAL REGION: ICD-10-CM

## 2022-10-11 ENCOUNTER — RESULT REVIEW (OUTPATIENT)
Age: 32
End: 2022-10-11

## 2022-12-28 ENCOUNTER — ASOB RESULT (OUTPATIENT)
Age: 32
End: 2022-12-28

## 2022-12-28 ENCOUNTER — APPOINTMENT (OUTPATIENT)
Dept: ANTEPARTUM | Facility: CLINIC | Age: 32
End: 2022-12-28

## 2022-12-28 PROCEDURE — 99202 OFFICE O/P NEW SF 15 MIN: CPT | Mod: 25

## 2022-12-28 PROCEDURE — 76811 OB US DETAILED SNGL FETUS: CPT

## 2023-01-18 ENCOUNTER — APPOINTMENT (OUTPATIENT)
Dept: ANTEPARTUM | Facility: CLINIC | Age: 33
End: 2023-01-18
Payer: COMMERCIAL

## 2023-01-18 ENCOUNTER — ASOB RESULT (OUTPATIENT)
Age: 33
End: 2023-01-18

## 2023-01-18 PROCEDURE — 76816 OB US FOLLOW-UP PER FETUS: CPT

## 2023-01-18 PROCEDURE — 76817 TRANSVAGINAL US OBSTETRIC: CPT

## 2023-02-15 ENCOUNTER — APPOINTMENT (OUTPATIENT)
Dept: ANTEPARTUM | Facility: CLINIC | Age: 33
End: 2023-02-15
Payer: COMMERCIAL

## 2023-02-15 ENCOUNTER — ASOB RESULT (OUTPATIENT)
Age: 33
End: 2023-02-15

## 2023-02-15 PROCEDURE — 76817 TRANSVAGINAL US OBSTETRIC: CPT

## 2023-02-15 PROCEDURE — 76816 OB US FOLLOW-UP PER FETUS: CPT

## 2023-02-15 PROCEDURE — 99202 OFFICE O/P NEW SF 15 MIN: CPT | Mod: 25

## 2023-03-06 ENCOUNTER — APPOINTMENT (OUTPATIENT)
Dept: ANTEPARTUM | Facility: CLINIC | Age: 33
End: 2023-03-06
Payer: COMMERCIAL

## 2023-03-06 ENCOUNTER — ASOB RESULT (OUTPATIENT)
Age: 33
End: 2023-03-06

## 2023-03-06 PROCEDURE — ZZZZZ: CPT

## 2023-03-06 PROCEDURE — 76816 OB US FOLLOW-UP PER FETUS: CPT

## 2023-03-06 PROCEDURE — 76817 TRANSVAGINAL US OBSTETRIC: CPT

## 2023-03-20 ENCOUNTER — APPOINTMENT (OUTPATIENT)
Dept: ANTEPARTUM | Facility: CLINIC | Age: 33
End: 2023-03-20
Payer: COMMERCIAL

## 2023-03-20 ENCOUNTER — ASOB RESULT (OUTPATIENT)
Age: 33
End: 2023-03-20

## 2023-03-20 PROCEDURE — 99212 OFFICE O/P EST SF 10 MIN: CPT | Mod: 25

## 2023-03-20 PROCEDURE — 76816 OB US FOLLOW-UP PER FETUS: CPT

## 2023-04-03 ENCOUNTER — ASOB RESULT (OUTPATIENT)
Age: 33
End: 2023-04-03

## 2023-04-03 ENCOUNTER — APPOINTMENT (OUTPATIENT)
Dept: ANTEPARTUM | Facility: CLINIC | Age: 33
End: 2023-04-03
Payer: COMMERCIAL

## 2023-04-03 ENCOUNTER — NON-APPOINTMENT (OUTPATIENT)
Age: 33
End: 2023-04-03

## 2023-04-03 PROCEDURE — 76816 OB US FOLLOW-UP PER FETUS: CPT

## 2023-04-03 PROCEDURE — 99212 OFFICE O/P EST SF 10 MIN: CPT | Mod: 25

## 2023-04-11 ENCOUNTER — ASOB RESULT (OUTPATIENT)
Age: 33
End: 2023-04-11

## 2023-04-11 ENCOUNTER — APPOINTMENT (OUTPATIENT)
Dept: ANTEPARTUM | Facility: CLINIC | Age: 33
End: 2023-04-11
Payer: COMMERCIAL

## 2023-04-11 PROCEDURE — 76818 FETAL BIOPHYS PROFILE W/NST: CPT

## 2023-04-11 PROCEDURE — 76820 UMBILICAL ARTERY ECHO: CPT

## 2023-04-18 ENCOUNTER — ASOB RESULT (OUTPATIENT)
Age: 33
End: 2023-04-18

## 2023-04-18 ENCOUNTER — APPOINTMENT (OUTPATIENT)
Dept: ANTEPARTUM | Facility: CLINIC | Age: 33
End: 2023-04-18
Payer: COMMERCIAL

## 2023-04-18 PROCEDURE — 76816 OB US FOLLOW-UP PER FETUS: CPT

## 2023-04-18 PROCEDURE — 76818 FETAL BIOPHYS PROFILE W/NST: CPT

## 2023-04-21 ENCOUNTER — APPOINTMENT (OUTPATIENT)
Dept: ANTEPARTUM | Facility: CLINIC | Age: 33
End: 2023-04-21

## 2023-04-24 ENCOUNTER — APPOINTMENT (OUTPATIENT)
Dept: ANTEPARTUM | Facility: CLINIC | Age: 33
End: 2023-04-24
Payer: COMMERCIAL

## 2023-04-24 ENCOUNTER — ASOB RESULT (OUTPATIENT)
Age: 33
End: 2023-04-24

## 2023-04-24 PROCEDURE — 76817 TRANSVAGINAL US OBSTETRIC: CPT

## 2023-04-24 PROCEDURE — 99212 OFFICE O/P EST SF 10 MIN: CPT | Mod: 25

## 2023-04-24 PROCEDURE — 76818 FETAL BIOPHYS PROFILE W/NST: CPT

## 2023-05-02 ENCOUNTER — ASOB RESULT (OUTPATIENT)
Age: 33
End: 2023-05-02

## 2023-05-02 ENCOUNTER — APPOINTMENT (OUTPATIENT)
Dept: ANTEPARTUM | Facility: CLINIC | Age: 33
End: 2023-05-02
Payer: COMMERCIAL

## 2023-05-02 PROCEDURE — 99213 OFFICE O/P EST LOW 20 MIN: CPT | Mod: 25

## 2023-05-02 PROCEDURE — 76818 FETAL BIOPHYS PROFILE W/NST: CPT

## 2023-05-03 ENCOUNTER — INPATIENT (INPATIENT)
Facility: HOSPITAL | Age: 33
LOS: 1 days | Discharge: HOME | End: 2023-05-05
Attending: OBSTETRICS & GYNECOLOGY | Admitting: OBSTETRICS & GYNECOLOGY
Payer: COMMERCIAL

## 2023-05-03 VITALS
TEMPERATURE: 99 F | HEART RATE: 79 BPM | SYSTOLIC BLOOD PRESSURE: 121 MMHG | RESPIRATION RATE: 18 BRPM | DIASTOLIC BLOOD PRESSURE: 71 MMHG

## 2023-05-03 DIAGNOSIS — Z34.80 ENCOUNTER FOR SUPERVISION OF OTHER NORMAL PREGNANCY, UNSPECIFIED TRIMESTER: ICD-10-CM

## 2023-05-03 DIAGNOSIS — O26.899 OTHER SPECIFIED PREGNANCY RELATED CONDITIONS, UNSPECIFIED TRIMESTER: ICD-10-CM

## 2023-05-03 LAB
BLD GP AB SCN SERPL QL: NEGATIVE — SIGNIFICANT CHANGE UP
COVID-19 SPIKE DOMAIN AB INTERP: POSITIVE
COVID-19 SPIKE DOMAIN ANTIBODY RESULT: >250 U/ML — HIGH
HCT VFR BLD CALC: 31.1 % — LOW (ref 34.5–45)
HGB BLD-MCNC: 10.5 G/DL — LOW (ref 11.5–15.5)
MCHC RBC-ENTMCNC: 31.9 PG — SIGNIFICANT CHANGE UP (ref 27–34)
MCHC RBC-ENTMCNC: 33.8 GM/DL — SIGNIFICANT CHANGE UP (ref 32–36)
MCV RBC AUTO: 94.5 FL — SIGNIFICANT CHANGE UP (ref 80–100)
NRBC # BLD: 0 /100 WBCS — SIGNIFICANT CHANGE UP (ref 0–0)
PLATELET # BLD AUTO: 165 K/UL — SIGNIFICANT CHANGE UP (ref 150–400)
RBC # BLD: 3.29 M/UL — LOW (ref 3.8–5.2)
RBC # FLD: 13.1 % — SIGNIFICANT CHANGE UP (ref 10.3–14.5)
RH IG SCN BLD-IMP: POSITIVE — SIGNIFICANT CHANGE UP
SARS-COV-2 IGG+IGM SERPL QL IA: >250 U/ML — HIGH
SARS-COV-2 IGG+IGM SERPL QL IA: POSITIVE
T PALLIDUM AB TITR SER: NEGATIVE — SIGNIFICANT CHANGE UP
WBC # BLD: 7.76 K/UL — SIGNIFICANT CHANGE UP (ref 3.8–10.5)
WBC # FLD AUTO: 7.76 K/UL — SIGNIFICANT CHANGE UP (ref 3.8–10.5)

## 2023-05-03 RX ORDER — OXYTOCIN 10 UNIT/ML
4 VIAL (ML) INJECTION
Qty: 30 | Refills: 0 | Status: DISCONTINUED | OUTPATIENT
Start: 2023-05-03 | End: 2023-05-03

## 2023-05-03 RX ORDER — CHLORHEXIDINE GLUCONATE 213 G/1000ML
1 SOLUTION TOPICAL ONCE
Refills: 0 | Status: DISCONTINUED | OUTPATIENT
Start: 2023-05-03 | End: 2023-05-03

## 2023-05-03 RX ORDER — OXYCODONE HYDROCHLORIDE 5 MG/1
5 TABLET ORAL
Refills: 0 | Status: DISCONTINUED | OUTPATIENT
Start: 2023-05-03 | End: 2023-05-05

## 2023-05-03 RX ORDER — DIPHENHYDRAMINE HCL 50 MG
25 CAPSULE ORAL EVERY 6 HOURS
Refills: 0 | Status: DISCONTINUED | OUTPATIENT
Start: 2023-05-03 | End: 2023-05-05

## 2023-05-03 RX ORDER — MAGNESIUM HYDROXIDE 400 MG/1
30 TABLET, CHEWABLE ORAL
Refills: 0 | Status: DISCONTINUED | OUTPATIENT
Start: 2023-05-03 | End: 2023-05-05

## 2023-05-03 RX ORDER — CITRIC ACID/SODIUM CITRATE 300-500 MG
15 SOLUTION, ORAL ORAL EVERY 6 HOURS
Refills: 0 | Status: DISCONTINUED | OUTPATIENT
Start: 2023-05-03 | End: 2023-05-03

## 2023-05-03 RX ORDER — SIMETHICONE 80 MG/1
80 TABLET, CHEWABLE ORAL EVERY 4 HOURS
Refills: 0 | Status: DISCONTINUED | OUTPATIENT
Start: 2023-05-03 | End: 2023-05-05

## 2023-05-03 RX ORDER — OXYTOCIN 10 UNIT/ML
41.67 VIAL (ML) INJECTION
Qty: 20 | Refills: 0 | Status: DISCONTINUED | OUTPATIENT
Start: 2023-05-03 | End: 2023-05-05

## 2023-05-03 RX ORDER — HYDROCORTISONE 1 %
1 OINTMENT (GRAM) TOPICAL EVERY 6 HOURS
Refills: 0 | Status: DISCONTINUED | OUTPATIENT
Start: 2023-05-03 | End: 2023-05-05

## 2023-05-03 RX ORDER — TETANUS TOXOID, REDUCED DIPHTHERIA TOXOID AND ACELLULAR PERTUSSIS VACCINE, ADSORBED 5; 2.5; 8; 8; 2.5 [IU]/.5ML; [IU]/.5ML; UG/.5ML; UG/.5ML; UG/.5ML
0.5 SUSPENSION INTRAMUSCULAR ONCE
Refills: 0 | Status: DISCONTINUED | OUTPATIENT
Start: 2023-05-03 | End: 2023-05-05

## 2023-05-03 RX ORDER — SODIUM CHLORIDE 9 MG/ML
1000 INJECTION, SOLUTION INTRAVENOUS
Refills: 0 | Status: DISCONTINUED | OUTPATIENT
Start: 2023-05-03 | End: 2023-05-03

## 2023-05-03 RX ORDER — KETOROLAC TROMETHAMINE 30 MG/ML
30 SYRINGE (ML) INJECTION ONCE
Refills: 0 | Status: DISCONTINUED | OUTPATIENT
Start: 2023-05-03 | End: 2023-05-03

## 2023-05-03 RX ORDER — IBUPROFEN 200 MG
600 TABLET ORAL EVERY 6 HOURS
Refills: 0 | Status: COMPLETED | OUTPATIENT
Start: 2023-05-03 | End: 2024-03-31

## 2023-05-03 RX ORDER — SODIUM CHLORIDE 9 MG/ML
3 INJECTION INTRAMUSCULAR; INTRAVENOUS; SUBCUTANEOUS EVERY 8 HOURS
Refills: 0 | Status: DISCONTINUED | OUTPATIENT
Start: 2023-05-03 | End: 2023-05-05

## 2023-05-03 RX ORDER — AER TRAVELER 0.5 G/1
1 SOLUTION RECTAL; TOPICAL EVERY 4 HOURS
Refills: 0 | Status: DISCONTINUED | OUTPATIENT
Start: 2023-05-03 | End: 2023-05-05

## 2023-05-03 RX ORDER — BENZOCAINE 10 %
1 GEL (GRAM) MUCOUS MEMBRANE EVERY 6 HOURS
Refills: 0 | Status: DISCONTINUED | OUTPATIENT
Start: 2023-05-03 | End: 2023-05-05

## 2023-05-03 RX ORDER — ACETAMINOPHEN 500 MG
975 TABLET ORAL
Refills: 0 | Status: DISCONTINUED | OUTPATIENT
Start: 2023-05-03 | End: 2023-05-05

## 2023-05-03 RX ORDER — PRAMOXINE HYDROCHLORIDE 150 MG/15G
1 AEROSOL, FOAM RECTAL EVERY 4 HOURS
Refills: 0 | Status: DISCONTINUED | OUTPATIENT
Start: 2023-05-03 | End: 2023-05-05

## 2023-05-03 RX ORDER — OXYTOCIN 10 UNIT/ML
333.33 VIAL (ML) INJECTION
Qty: 20 | Refills: 0 | Status: DISCONTINUED | OUTPATIENT
Start: 2023-05-03 | End: 2023-05-05

## 2023-05-03 RX ORDER — OXYCODONE HYDROCHLORIDE 5 MG/1
5 TABLET ORAL ONCE
Refills: 0 | Status: DISCONTINUED | OUTPATIENT
Start: 2023-05-03 | End: 2023-05-05

## 2023-05-03 RX ORDER — DIBUCAINE 1 %
1 OINTMENT (GRAM) RECTAL EVERY 6 HOURS
Refills: 0 | Status: DISCONTINUED | OUTPATIENT
Start: 2023-05-03 | End: 2023-05-05

## 2023-05-03 RX ORDER — IBUPROFEN 200 MG
600 TABLET ORAL EVERY 6 HOURS
Refills: 0 | Status: DISCONTINUED | OUTPATIENT
Start: 2023-05-03 | End: 2023-05-05

## 2023-05-03 RX ORDER — LANOLIN
1 OINTMENT (GRAM) TOPICAL EVERY 6 HOURS
Refills: 0 | Status: DISCONTINUED | OUTPATIENT
Start: 2023-05-03 | End: 2023-05-05

## 2023-05-03 RX ADMIN — SODIUM CHLORIDE 3 MILLILITER(S): 9 INJECTION INTRAMUSCULAR; INTRAVENOUS; SUBCUTANEOUS at 22:00

## 2023-05-03 RX ADMIN — Medication 30 MILLIGRAM(S): at 21:29

## 2023-05-03 RX ADMIN — Medication 125 MILLIUNIT(S)/MIN: at 17:56

## 2023-05-03 RX ADMIN — SODIUM CHLORIDE 125 MILLILITER(S): 9 INJECTION, SOLUTION INTRAVENOUS at 10:19

## 2023-05-03 RX ADMIN — Medication 4 MILLIUNIT(S)/MIN: at 10:19

## 2023-05-03 NOTE — OB PROVIDER DELIVERY SUMMARY - NSPROVIDERDELIVERYNOTE_OBGYN_ALL_OB_FT
NIKKI NL MALE INFANT----DELAYED CORD CLAMPING X 1 MINUTE  APGARS 9/9  PLACENTA SPONT INTACT----UTERUS EXPLORED AND EMPTY  FIRST DEG LAC REPAIRED   CC'S  PT TO RR IN GOOD CONDITION  MARLON GUTIÉRREZ MD

## 2023-05-03 NOTE — OB PROVIDER DELIVERY SUMMARY - NSLOWPPHRISK_OBGYN_A_OB
No previous uterine incision/Stephens Pregnancy/Less than or equal to 4 previous vaginal births/No known bleeding disorder/No history of postpartum hemorrhage/No other PPH risks indicated

## 2023-05-03 NOTE — OB PROVIDER LABOR PROGRESS NOTE - ASSESSMENT
attg note  cx 2/80/-2  fhr cat 1  arm clear  pit at 8  epid in place  cont present mgmt  t diony khoury

## 2023-05-03 NOTE — OB PROVIDER H&P - NSPRIMARYCAREPROV_OBGYN_ALL_OB
MD//ARABELLA/CHUYITA Niacinamide Pregnancy And Lactation Text: These medications are considered safe during pregnancy.

## 2023-05-03 NOTE — OB RN DELIVERY SUMMARY - NS_SEPSISRSKCALC_OBGYN_ALL_OB_FT
EOS calculated successfully. EOS Risk Factor: 0.5/1000 live births ([facility] national incidence); GA=38w1d; Temp=99.14; ROM=6.45; GBS='Negative'; Antibiotics='No antibiotics or any antibiotics < 2 hrs prior to birth'

## 2023-05-03 NOTE — PRE-ANESTHESIA EVALUATION ADULT - NSANTHOSAYNRD_GEN_A_CORE
No. DIGNA screening performed.  STOP BANG Legend: 0-2 = LOW Risk; 3-4 = INTERMEDIATE Risk; 5-8 = HIGH Risk

## 2023-05-03 NOTE — OB RN DELIVERY SUMMARY - NSSELHIDDEN_OBGYN_ALL_OB_FT
[NS_DeliveryAttending1_OBGYN_ALL_OB_FT:[id number]],[NS_DeliveryRN_OBGYN_ALL_OB_FT:[id number]]

## 2023-05-03 NOTE — OB RN PATIENT PROFILE - FALL HARM RISK - UNIVERSAL INTERVENTIONS
Bed in lowest position, wheels locked, appropriate side rails in place/Call bell, personal items and telephone in reach/Instruct patient to call for assistance before getting out of bed or chair/Non-slip footwear when patient is out of bed/[place] to call system/Physically safe environment - no spills, clutter or unnecessary equipment/Purposeful Proactive Rounding/Room/bathroom lighting operational, light cord in reach

## 2023-05-03 NOTE — OB PROVIDER H&P - ASSESSMENT
31 yo  at 38w1d GA admitted for ECV to IOL. Pregnancy c/b vasa previa, now resolved, and unstable lie. Fetus in transverse presentation today, head on maternal left     Plan  - Admit to LND. Routine Labs. IVF.  - IOL if ECV successful, add on C/S if unsuccessful   - Fetus: cat 1 tracing. transverse. EFW 3400g by sono. Continuous EFM. Sono. No concerns.  - Prenatal issues: transverse presentation of fetus, resolved vasa previa   - GBS neg  - Pain: epidural prior to ECV     Patient discussed with attending physician, Dr. Dawna Izaguirre MD PGY2

## 2023-05-03 NOTE — OB PROVIDER H&P - HISTORY OF PRESENT ILLNESS
Admission H&P    Subjective  HPI: 32yoF  at 38w1d gestational age presents for scheduled ECV to IOL if successful. +FM. -LOF. -CTXs. -VB. Pt denies any other concerns.    – PNC: Vasa previa, now resolved. Unstable lie. Fetus was vertex last week. GBS neg.  EFW 3400g by sono.  – OBHx:    TOP with D&C at 12 weeks   2018 6#12    Dec 2019 7#10   – GynHx: denies  – PMH: denies  – PSH: D&C as above   – Psych: denies   – Social: denies   – Meds: PNV   – Allergies: NKDA  – Will accept blood transfusions? Yes

## 2023-05-03 NOTE — OB RN PATIENT PROFILE - FUNCTIONAL ASSESSMENT - BASIC MOBILITY 6.
4-calculated by average/Not able to assess (calculate score using [facility] averaging method)

## 2023-05-03 NOTE — OB PROVIDER LABOR PROGRESS NOTE - ASSESSMENT
attg note  pt presented to l&d for ecv, but found to have spont verted  cx 1.5/80/-2  intact bow  will start pit induction  parth vora md

## 2023-05-03 NOTE — OB PROVIDER H&P - NSHPPHYSICALEXAM_GEN_ALL_CORE
Objective  – Vital Signs  Vital Signs Last 24 Hrs  T(C): 37 (03 May 2023 06:41), Max: 37.0 (03 May 2023 06:37)  T(F): 98.6 (03 May 2023 06:41), Max: 98.6 (03 May 2023 06:37)  HR: 90 (03 May 2023 07:08) (67 - 93)  BP: 121/71 (03 May 2023 06:41) (121/70 - 121/71)  BP(mean): --  RR: 18 (03 May 2023 06:41) (18 - 18)  SpO2: 99% (03 May 2023 07:08) (98% - 100%)    – PE:   CV: RRR  Pulm: breathing comfortably on RA  Abd: gravid, nontender  Extr: moving all extremities with ease  – VE: deferred  – FHT: baseline 145, mod variability, +accels, -decels  – [place]: not yoshi  – EFW: 3400g by sono  – Sono: vertex

## 2023-05-04 ENCOUNTER — TRANSCRIPTION ENCOUNTER (OUTPATIENT)
Age: 33
End: 2023-05-04

## 2023-05-04 PROCEDURE — 85027 COMPLETE CBC AUTOMATED: CPT

## 2023-05-04 PROCEDURE — 86769 SARS-COV-2 COVID-19 ANTIBODY: CPT

## 2023-05-04 PROCEDURE — 86780 TREPONEMA PALLIDUM: CPT

## 2023-05-04 PROCEDURE — 86850 RBC ANTIBODY SCREEN: CPT

## 2023-05-04 PROCEDURE — 86900 BLOOD TYPING SEROLOGIC ABO: CPT

## 2023-05-04 PROCEDURE — 59050 FETAL MONITOR W/REPORT: CPT

## 2023-05-04 PROCEDURE — 86901 BLOOD TYPING SEROLOGIC RH(D): CPT

## 2023-05-04 RX ADMIN — SODIUM CHLORIDE 3 MILLILITER(S): 9 INJECTION INTRAMUSCULAR; INTRAVENOUS; SUBCUTANEOUS at 06:22

## 2023-05-04 RX ADMIN — Medication 1 TABLET(S): at 12:24

## 2023-05-04 NOTE — DISCHARGE NOTE OB - PATIENT PORTAL LINK FT
You can access the FollowMyHealth Patient Portal offered by [facility] by registering at the following website: [email or web address]. By joining [facility]’s FollowMyHealth portal, you will also be able to view your health information using other applications (apps) compatible with our system.

## 2023-05-04 NOTE — DISCHARGE NOTE OB - MATERIALS PROVIDED
[facility] [place] Screening Program/Breastfeeding Log/Breastfeeding Mother’s Support Group Information/Guide to Postpartum Care/[facility] Hearing Screen Program/Back To Sleep Handout/Shaken Baby Prevention Handout/Breastfeeding Guide and Packet/Birth Certificate Instructions

## 2023-05-04 NOTE — PROGRESS NOTE ADULT - ASSESSMENT
A/P: 33yo PPD#1 s/p .  Patient is stable and progressing appropriately    #Post-partum  - Pain well controlled, continue current pain regimen  - Continue regular diet    Jhoan Mendiola, PGY-1  Ob/Gyn

## 2023-05-04 NOTE — DISCHARGE NOTE OB - MEDICATION SUMMARY - MEDICATIONS TO TAKE
I will START or STAY ON the medications listed below when I get home from the hospital:    ibuprofen 600 mg oral tablet  -- 1 tab(s) by mouth every 6 hours  -- Indication: For pain    acetaminophen 325 mg oral tablet  -- 2 tab(s) by mouth every 6 hours  -- Indication: For pain    Prenatal Multivitamins with Folic Acid 1 mg oral tablet  -- 1 tab(s) by mouth once a day  -- Indication: For postpartum

## 2023-05-04 NOTE — DISCHARGE NOTE OB - CARE PROVIDER_API CALL
Mac Toney)  Obstetrics and Gynecology  [address] [place] [address], [address]  [place] [address]  Phone: [phone]  Fax: [phone]  Follow Up Time: Routine

## 2023-05-04 NOTE — DISCHARGE NOTE OB - INSTRUCTIONS
Follow up with OB as advised. Follow up with OB as advised. Call if any increased pain, fever, chills, pain on urination, heavy vaginal bleeding.

## 2023-05-05 VITALS
TEMPERATURE: 98 F | OXYGEN SATURATION: 100 % | RESPIRATION RATE: 18 BRPM | HEART RATE: 65 BPM | SYSTOLIC BLOOD PRESSURE: 114 MMHG | DIASTOLIC BLOOD PRESSURE: 78 MMHG

## 2023-05-05 RX ORDER — ACETAMINOPHEN 500 MG
2 TABLET ORAL
Qty: 0 | Refills: 0 | DISCHARGE
Start: 2023-05-05

## 2023-05-05 RX ORDER — IBUPROFEN 200 MG
1 TABLET ORAL
Qty: 0 | Refills: 0 | DISCHARGE
Start: 2023-05-05

## 2023-05-05 NOTE — PROGRESS NOTE ADULT - SUBJECTIVE AND OBJECTIVE BOX
OB Progress Note:  PPD#1    S: 31yo PPD#1 s/p . Pain controlled. Patient tolerating regular diet, voiding spontaneously, and ambulating. Denies significant VB, chest pain, shortness of breath, n/v, light-headedness/dizziness.       O:  Vitals:  Vital Signs Last 24 Hrs  T(C): 36.7 (04 May 2023 05:32), Max: 37.3 (03 May 2023 12:17)  T(F): 98 (04 May 2023 05:32), Max: 99.14 (03 May 2023 12:17)  HR: 65 (04 May 2023 05:32) (53 - 129)  BP: 102/66 (04 May 2023 05:32) (102/66 - 163/71)  BP(mean): 86 (03 May 2023 19:35) (86 - 90)  RR: 18 (04 May 2023 05:32) (18 - 18)  SpO2: 99% (04 May 2023 05:32) (90% - 100%)    Parameters below as of 04 May 2023 05:32  Patient On (Oxygen Delivery Method): room air        MEDICATIONS  (STANDING):  acetaminophen     Tablet .. 975 milliGRAM(s) Oral <User Schedule>  diphtheria/tetanus/pertussis (acellular) Vaccine (Adacel) 0.5 milliLiter(s) IntraMuscular once  ibuprofen  Tablet. 600 milliGRAM(s) Oral every 6 hours  oxytocin Infusion 333.333 milliUNIT(s)/Min (1000 mL/Hr) IV Continuous <Continuous>  oxytocin Infusion 41.667 milliUNIT(s)/Min (125 mL/Hr) IV Continuous <Continuous>  prenatal multivitamin 1 Tablet(s) Oral daily  sodium chloride 0.9% lock flush 3 milliLiter(s) IV Push every 8 hours      Labs:  Blood type: O Positive  Rubella IgG: RPR: Negative                          10.5<L>   7.76 >-----------< 165    (  @ 07:51 )             31.1<L>                  Physical Exam:  General: No acute distress. Resting comfortably prior to evaluation  Respiratory: No respiratory distress. Unlabored breathing   Abdomen: Soft. Non-tender. Non-distended. Fundus is firm  Extremities: No calf tenderness bilaterally    
S: Patient is doing well without complaints. Tolerates regular diet. She states lochia is in WNL. Ambulating without difficulty. Denies N/V. Voiding freely. Passing flatus. Pain well controlled with oral pain medications. Denies any HA/vision changes, CP/SOB, F/C/S.    O: Vital Signs Last 24 Hrs  T(C): 36.5 (05 May 2023 08:38), Max: 37.1 (04 May 2023 12:42)  T(F): 97.7 (05 May 2023 08:38), Max: 98.7 (04 May 2023 12:42)  HR: 65 (05 May 2023 08:38) (65 - 76)  BP: 114/78 (05 May 2023 08:38) (108/69 - 116/76)  BP(mean): --  RR: 18 (05 May 2023 08:38) (18 - 18)  SpO2: 100% (05 May 2023 08:38) (96% - 100%)    Parameters below as of 05 May 2023 08:38  Patient On (Oxygen Delivery Method): room air        Physical Exam:  General: NAD  Abdomen: soft, non-tender, non-distended, fundus firm  Vaginal: deferred  Ext: NTBL    Labs:                MEDICATIONS  (STANDING):  acetaminophen     Tablet .. 975 milliGRAM(s) Oral <User Schedule>  diphtheria/tetanus/pertussis (acellular) Vaccine (Adacel) 0.5 milliLiter(s) IntraMuscular once  ibuprofen  Tablet. 600 milliGRAM(s) Oral every 6 hours  oxytocin Infusion 333.333 milliUNIT(s)/Min (1000 mL/Hr) IV Continuous <Continuous>  oxytocin Infusion 41.667 milliUNIT(s)/Min (125 mL/Hr) IV Continuous <Continuous>  prenatal multivitamin 1 Tablet(s) Oral daily  sodium chloride 0.9% lock flush 3 milliLiter(s) IV Push every 8 hours

## 2023-05-09 ENCOUNTER — APPOINTMENT (OUTPATIENT)
Dept: ANTEPARTUM | Facility: CLINIC | Age: 33
End: 2023-05-09

## 2023-05-16 ENCOUNTER — APPOINTMENT (OUTPATIENT)
Dept: ANTEPARTUM | Facility: CLINIC | Age: 33
End: 2023-05-16

## 2023-10-06 ENCOUNTER — NON-APPOINTMENT (OUTPATIENT)
Age: 33
End: 2023-10-06

## 2023-10-06 PROBLEM — M79.671 PAIN OF RIGHT HEEL: Status: ACTIVE | Noted: 2023-10-06

## 2023-10-06 PROBLEM — Z78.9 OTHER SPECIFIED HEALTH STATUS: Chronic | Status: ACTIVE | Noted: 2023-05-03

## 2023-10-07 ENCOUNTER — APPOINTMENT (OUTPATIENT)
Dept: ORTHOPEDIC SURGERY | Facility: CLINIC | Age: 33
End: 2023-10-07
Payer: COMMERCIAL

## 2023-10-07 VITALS
BODY MASS INDEX: 18.83 KG/M2 | HEIGHT: 67 IN | HEART RATE: 53 BPM | SYSTOLIC BLOOD PRESSURE: 125 MMHG | WEIGHT: 120 LBS | DIASTOLIC BLOOD PRESSURE: 72 MMHG

## 2023-10-07 DIAGNOSIS — M79.671 PAIN IN RIGHT FOOT: ICD-10-CM

## 2023-10-07 DIAGNOSIS — S90.31XA CONTUSION OF RIGHT FOOT, INITIAL ENCOUNTER: ICD-10-CM

## 2023-10-07 PROCEDURE — 99203 OFFICE O/P NEW LOW 30 MIN: CPT

## 2023-10-07 PROCEDURE — 73650 X-RAY EXAM OF HEEL: CPT | Mod: RT

## 2023-10-09 PROBLEM — S90.31XA CONTUSION OF RIGHT HEEL, INITIAL ENCOUNTER: Status: ACTIVE | Noted: 2023-10-07

## 2025-04-09 ENCOUNTER — APPOINTMENT (OUTPATIENT)
Dept: OBGYN | Facility: CLINIC | Age: 35
End: 2025-04-09
Payer: COMMERCIAL

## 2025-04-09 PROCEDURE — 36415 COLL VENOUS BLD VENIPUNCTURE: CPT

## 2025-04-09 PROCEDURE — 76817 TRANSVAGINAL US OBSTETRIC: CPT

## 2025-04-09 PROCEDURE — 99214 OFFICE O/P EST MOD 30 MIN: CPT | Mod: 25

## 2025-04-23 ENCOUNTER — APPOINTMENT (OUTPATIENT)
Dept: OBGYN | Facility: CLINIC | Age: 35
End: 2025-04-23
Payer: COMMERCIAL

## 2025-04-23 ENCOUNTER — TRANSCRIPTION ENCOUNTER (OUTPATIENT)
Age: 35
End: 2025-04-23

## 2025-04-23 PROCEDURE — 0502F SUBSEQUENT PRENATAL CARE: CPT

## 2025-04-23 PROCEDURE — 76817 TRANSVAGINAL US OBSTETRIC: CPT

## 2025-04-23 PROCEDURE — 36415 COLL VENOUS BLD VENIPUNCTURE: CPT

## 2025-05-06 NOTE — PHYSICAL EXAM
Virtual Nurse rounding complete.    Round Needs: No needs at this time.     [de-identified] : Constitutional: Well-nourished, well-developed, No acute distress\par Respiratory:  Good respiratory effort, no SOB\par Psychiatric: Pleasant and normal affect, alert and oriented x3\par Musculoskeletal: normal except where as noted in regional exam\par \par \par Right Shoulder:\par APPEARANCE: no marked deformities, no swelling or malalignment\par POSITIVE TENDERNESS: none\par NONTENDER: supraspinatus, infraspinatus, teres minor, LH biceps, anterior and posterior capsule, AC joint\par ROM: full & painless, no scapular winging or dyskinesia present\par RESISTIVE TESTING: painless 5/5 resisted flex/ext, empty can/ER/IR, horizontal abd/add \par SPECIAL TESTS: neg Drop Arm, neg Empty Can, neg Ponce/Neers, neg Hassan's, neg Speeds, neg Apprehension, neg cross arm adduction, neg apley's scratch test\par \par Left Shoulder:\par APPEARANCE: no marked deformities, no swelling or malalignment\par POSITIVE TENDERNESS: LH biceps tendon in the bicipital groove\par NONTENDER: supraspinatus, infraspinatus, teres minor. anterior and posterior capsule. AC joint. \par ROM: full & painless, no scapular winging or dyskinesia present\par RESISTIVE TESTING: Mild pain in anterior shoulder with resisted flexion with hand in supination, painless 5/5 resisted ext, empty can/ER/IR, horizontal abd/add \par SPECIAL TESTS: + Speed's test for pain without subluxation, + yerguson's test for pain in ant shoulder, neg Drop Arm, neg Ponce/Neers. neg Hassan's. neg Apprehension. neg cross arm adduction, neg apley's scratch test\par

## 2025-05-07 ENCOUNTER — APPOINTMENT (OUTPATIENT)
Dept: OBGYN | Facility: CLINIC | Age: 35
End: 2025-05-07
Payer: COMMERCIAL

## 2025-05-07 PROCEDURE — 0502F SUBSEQUENT PRENATAL CARE: CPT

## 2025-05-07 PROCEDURE — 36415 COLL VENOUS BLD VENIPUNCTURE: CPT

## 2025-05-07 PROCEDURE — 76813 OB US NUCHAL MEAS 1 GEST: CPT

## 2025-06-04 ENCOUNTER — APPOINTMENT (OUTPATIENT)
Dept: OBGYN | Facility: CLINIC | Age: 35
End: 2025-06-04
Payer: COMMERCIAL

## 2025-06-04 PROCEDURE — 36415 COLL VENOUS BLD VENIPUNCTURE: CPT

## 2025-06-04 PROCEDURE — 0502F SUBSEQUENT PRENATAL CARE: CPT

## 2025-07-08 ENCOUNTER — APPOINTMENT (OUTPATIENT)
Dept: ANTEPARTUM | Facility: CLINIC | Age: 35
End: 2025-07-08
Payer: COMMERCIAL

## 2025-07-08 ENCOUNTER — ASOB RESULT (OUTPATIENT)
Age: 35
End: 2025-07-08

## 2025-07-08 PROCEDURE — 76811 OB US DETAILED SNGL FETUS: CPT

## 2025-07-15 ENCOUNTER — APPOINTMENT (OUTPATIENT)
Dept: OBGYN | Facility: CLINIC | Age: 35
End: 2025-07-15
Payer: COMMERCIAL

## 2025-07-15 PROCEDURE — 0502F SUBSEQUENT PRENATAL CARE: CPT

## 2025-08-13 ENCOUNTER — APPOINTMENT (OUTPATIENT)
Dept: OBGYN | Facility: CLINIC | Age: 35
End: 2025-08-13
Payer: COMMERCIAL

## 2025-08-13 PROCEDURE — 0502F SUBSEQUENT PRENATAL CARE: CPT

## 2025-08-27 ENCOUNTER — APPOINTMENT (OUTPATIENT)
Dept: OBGYN | Facility: CLINIC | Age: 35
End: 2025-08-27
Payer: COMMERCIAL

## 2025-08-27 PROCEDURE — 90471 IMMUNIZATION ADMIN: CPT

## 2025-08-27 PROCEDURE — 36415 COLL VENOUS BLD VENIPUNCTURE: CPT

## 2025-08-27 PROCEDURE — 90715 TDAP VACCINE 7 YRS/> IM: CPT

## 2025-08-27 PROCEDURE — 0502F SUBSEQUENT PRENATAL CARE: CPT
